# Patient Record
Sex: FEMALE | Race: AMERICAN INDIAN OR ALASKA NATIVE
[De-identification: names, ages, dates, MRNs, and addresses within clinical notes are randomized per-mention and may not be internally consistent; named-entity substitution may affect disease eponyms.]

---

## 2022-07-20 ENCOUNTER — HOSPITAL ENCOUNTER (EMERGENCY)
Dept: HOSPITAL 5 - ED | Age: 22
LOS: 1 days | Discharge: HOME | End: 2022-07-21
Payer: SELF-PAY

## 2022-07-20 DIAGNOSIS — Z13.30: Primary | ICD-10-CM

## 2022-07-20 LAB
BASOPHILS # (AUTO): 0 K/MM3 (ref 0–0.1)
BASOPHILS NFR BLD AUTO: 0.9 % (ref 0–1.8)
BUN SERPL-MCNC: 11 MG/DL (ref 7–17)
BUN/CREAT SERPL: 14 %
CALCIUM SERPL-MCNC: 9.9 MG/DL (ref 8.4–10.2)
EOSINOPHIL # BLD AUTO: 0 K/MM3 (ref 0–0.4)
EOSINOPHIL NFR BLD AUTO: 0.7 % (ref 0–4.3)
HCT VFR BLD CALC: 39 % (ref 30.3–42.9)
HEMOLYSIS INDEX: 5
HGB BLD-MCNC: 12.9 GM/DL (ref 10.1–14.3)
LYMPHOCYTES # BLD AUTO: 1.9 K/MM3 (ref 1.2–5.4)
LYMPHOCYTES NFR BLD AUTO: 40.2 % (ref 13.4–35)
MCHC RBC AUTO-ENTMCNC: 33 % (ref 30–34)
MCV RBC AUTO: 89 FL (ref 79–97)
MONOCYTES # (AUTO): 0.3 K/MM3 (ref 0–0.8)
MONOCYTES % (AUTO): 6.8 % (ref 0–7.3)
PLATELET # BLD: 257 K/MM3 (ref 140–440)
RBC # BLD AUTO: 4.4 M/MM3 (ref 3.65–5.03)

## 2022-07-20 PROCEDURE — 84702 CHORIONIC GONADOTROPIN TEST: CPT

## 2022-07-20 PROCEDURE — 85025 COMPLETE CBC W/AUTO DIFF WBC: CPT

## 2022-07-20 PROCEDURE — 99284 EMERGENCY DEPT VISIT MOD MDM: CPT

## 2022-07-20 PROCEDURE — 80048 BASIC METABOLIC PNL TOTAL CA: CPT

## 2022-07-20 PROCEDURE — 36415 COLL VENOUS BLD VENIPUNCTURE: CPT

## 2022-07-20 PROCEDURE — 81001 URINALYSIS AUTO W/SCOPE: CPT

## 2022-07-20 PROCEDURE — 81025 URINE PREGNANCY TEST: CPT

## 2022-07-20 PROCEDURE — G0480 DRUG TEST DEF 1-7 CLASSES: HCPCS

## 2022-07-20 PROCEDURE — 80320 DRUG SCREEN QUANTALCOHOLS: CPT

## 2022-07-20 PROCEDURE — 80307 DRUG TEST PRSMV CHEM ANLYZR: CPT
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
## 2022-07-21 VITALS — DIASTOLIC BLOOD PRESSURE: 69 MMHG | SYSTOLIC BLOOD PRESSURE: 109 MMHG

## 2022-07-21 LAB
BACTERIA #/AREA URNS HPF: (no result) /HPF
BENZODIAZEPINES SCREEN,URINE: (no result)
METHADONE SCREEN,URINE: (no result)
MUCOUS THREADS #/AREA URNS HPF: (no result) /HPF
OPIATE SCREEN,URINE: (no result)
PH UR STRIP: 6 [PH] (ref 5–7)
RBC #/AREA URNS HPF: 4 /HPF (ref 0–6)
UROBILINOGEN UR-MCNC: 0 MG/DL (ref ?–2)
WBC #/AREA URNS HPF: 1 /HPF (ref 0–6)

## 2022-07-21 NOTE — EVENT NOTE
Date: 07/21/22


Patient assessed by me independently.  Serum labs and urinalysis results 

reviewed.  Documentation by prior ER provider, nursing staff, and mental health 

provider reviewed.  Vitals this morning are grossly unremarkable.  She is 

comfortable and well-appearing.  She is mentating well.  She does not appear to 

be acutely psychotic or responding to any internal stimuli.  I informed the 

patient that her chart was reviewed and her medications were written by the 

mental health provider.  All questions answered by me.  Patient stable for 

discharge to home.

## 2022-07-21 NOTE — CONSULTATION
History of Present Illness





- Reason for Consult


Consult date: 07/21/22


Reason for consult: agitation





- History of Present Psychiatric Illness


The patient was seen today. She is calm and cooperative. She says she had a 

manic episode at work yesterday. The patient says "I felt like fu**ing something

up but I didn't." She says her friend called her an ub[employer] to come to the 

hospital. The patient says she has a history of Bipolar. She says she's been off

of her meds and states "that's the problem. I need my meds." She says her sleep 

is up and down. The patient states "but I've had problems sleeping for years." 

The patient denies SI/HI or hallucinations of any kind. 





Will restart home medications, and have the patient follow up with outpatient 

psychiatrist. 





PAST PSYCHIATRIC HISTORY:


Diagnoses: Bipolar


Suicide attempts or Self-harm behavior: Denies


Prior psychiatric hospitalizations: Denies


Substance Abuse history: Denies


Previous psychiatric medications tried: Lamictal, prozosin


Outpatient treatment: Yes





PAST MEDICAL HISTORY: None reported





Family Psychiatric History: None reported or documented





SOCIAL HISTORY


Marital Status: Single


Living Arrangements:  with mom


Employment Status: Employed


Access to guns/weapons: Denies


Education: high school


History of Abuse: Denies


Legal History: Denies





REVIEW OF SYSTEMS


Constitutional: Negative for weight loss


ENT: Negative for stridor


Respiratory: Negative for cough or hemoptysis


All other systems reviewed and are negative


 


MENTAL STATUS EXAMINATION


General Appearance and Behavior: Age appropriate, wearing appropriate clothes, 

cooperative, polite with questioning, calm


Cooperation: cooperative


Psychomotor Behavior: Psychomotor normal


Mood: better


Affect and affective range: congruent with stated affect


Thought Process: goal directed


Thought Content: None


Speech: Normal tone and pace


Suicidal Ideation: Denies


Homicidal Ideation: Denies


Hallucination: Denies


Delusions: None elicited


Impulse Control: Limited


Insight and Judgment: Limited


Memory: Limited


Attention: attentive


Orientation: Alert and oriented





Diagnoses: 


Bipolar Disorder





Treatment Plan


Lammictal 25mg po BID


Prazosin 1mg po BID


Melatonin 5mg po qhs prn insomnia


Sitter: per primary


Medical: Per primary


Disposition: Do not recommend acute psychiatric inpatient treatment.


The  to give the patient all necessary resources


The patient to follow up with outpatient psych in 7 to 14 days upon discharge


Will sign off. Thanks


Case staffed by Dr. Robbins





Medications and Allergies


                                    Allergies











Allergy/AdvReac Type Severity Reaction Status Date / Time


 


No Known Allergies Allergy   Verified 07/20/22 17:23











                                Home Medications











 Medication  Instructions  Recorded  Confirmed  Last Taken  Type


 


Melatonin [Melatonin 5MG TAB] 5 mg PO DAILY #30 tablet 07/21/22  Unknown Rx


 


Prazosin 1 mg PO BID #60 cap 07/21/22  Unknown Rx


 


lamoTRIgine [LaMICtal] 25 mg PO BID #60 tab 07/21/22  Unknown Rx











Active Meds: 


Active Medications





Haloperidol Lactate (Haloperidol Lactate 5 Mg/1 Ml Inj)  5 mg IM Q6HR PRN


   PRN Reason: Agitation


Lorazepam (Lorazepam 2 Mg/Ml Vial)  2 mg IM Q4HR PRN


   PRN Reason: Agitation











Mental Status Exam





- Vital signs


                                Last Vital Signs











Temp  98.5 F   07/21/22 02:56


 


Pulse  63   07/21/22 02:56


 


Resp  16   07/21/22 02:56


 


BP  99/68   07/21/22 02:56


 


Pulse Ox  99   07/21/22 02:56














Results


Result Diagrams: 


                                 07/20/22 17:51





                                 07/20/22 17:51


                              Abnormal lab results











  07/20/22 07/20/22 07/20/22 Range/Units





  17:51 17:51 17:51 


 


Lymph % (Auto)  40.2 H    (13.4-35.0)  %


 


Ur Specific Gravity     (1.003-1.030)  


 


Salicylates   < 0.3 L   (2.8-20.0)  mg/dL


 


Acetaminophen    5.0 L  (10.0-30.0)  ug/mL














  07/20/22 Range/Units





  23:56 


 


Lymph % (Auto)   (13.4-35.0)  %


 


Ur Specific Gravity  1.035 H  (1.003-1.030)  


 


Salicylates   (2.8-20.0)  mg/dL


 


Acetaminophen   (10.0-30.0)  ug/mL








All other labs normal.